# Patient Record
(demographics unavailable — no encounter records)

---

## 2025-01-10 NOTE — DISCUSSION/SUMMARY
[FreeTextEntry1] : This is a 36-year-old male with past medical history significant for supraventricular tachycardia, status post ablation procedure at [facility] February 20, 2015 (cryocatheter ablation), who comes in for cardiac consultation. The patient was well until January 3, 2025 when he went to work and at 8 AM developed chest pressure followed by profound dizziness.  He felt that the room was spinning the chest pressure was localized to the center of his chest.  Of note he did not eat any breakfast that morning.  He was taken by ambulance to [facility] where he reports he was observed for few hours and sent home.  He has had no further symptoms of dizziness, or chest pressure. He does not drink excessive caffeine or alcohol.  He has no history of rheumatic fever. Cardiac risk factors include history of smoking a pack a day for approximately 8 years (stopped about 10 years ago). Family history: Noncontributory Electrocardiogram done January 10, 2025 demonstrate sinus bradycardia rate 57 bpm is otherwise remarkable for poor R wave progression. The patient had a stress echocardiogram done at [facility] January 2025 which demonstrated a normal stress test with a peak heart rate of 171 bpm, 13 METS of exercise and no evidence of myocardial ischemia.  Stress echocardiogram summary demonstrated normal augmentation with a peak ejection fraction of 65 to 70%, physiologic tricuspid valve regurgitation, physiologic pulmonic valve regurgitation and no wall motion abnormalities.-Normal study The patient's episode of chest pressure was consistent with GERD/dyspepsia. I recommend the patient make an appointment with a gastroenterologist. He is encouraged to maintain good hydration. He is still concerned about occasional palpitations that he may get.  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 I recommended he purchase a Kardia device to evaluate him during these times of symptoms. He is encouraged to maintain good hydration. He is currently hemodynamically stable and asymptomatic from cardiac standpoint.  He will have blood work done today for lipid panel, electrolytes, and SMA 20. He is encouraged to follow-up with his primary care physician. He is currently hemodynamically stable and asymptomatic from a cardiac standpoint.

## 2025-07-11 NOTE — ASSESSMENT
[FreeTextEntry1] : This is a 36-year-old male with past medical history significant for supraventricular tachycardia, status post ablation procedure at [facility] February 20, 2015 (cryocatheter ablation), who comes in for cardiac follow-up evaluation.  He does not drink excessive caffeine or alcohol.  He has no history of rheumatic fever.  Cardiac risk factors include history of smoking a pack a day for approximately 8 years (stopped about 10 years ago).  Family history: Noncontributory   HPI: He is feeling generally well today and denies chest pain, dizziness, heart palpitations, recent episodes of syncope or falls, SOB, or dyspnea at this time.  Current Medications: None  Works as a .    BLOOD PRESSURE: Stable    BLOOD WORK: -New blood work was done 07/11/2025 to evaluate lipid profile, CBC, BMP, hepatic function, A1C and TSH. - Blood work done January 2025 demonstrated triglycerides 162, cholesterol 99, HDL 36, , non-, LDL direct 135.   TESTING/REPORTS: -EKG done July 11, 2025 demonstrated regular sinus rhythm rate 61 bpm otherwise remarkable for nonspecific ST-T wave changes.  -Electrocardiogram done January 10, 2025 demonstrate sinus bradycardia rate 57 bpm is otherwise remarkable for poor R wave progression.  -The patient had a stress echocardiogram done at [facility] January 2025 which demonstrated a normal stress test with a peak heart rate of 171 bpm, 13 METS of exercise and no evidence of myocardial ischemia. Stress echocardiogram summary demonstrated normal augmentation with a peak ejection fraction of 65 to 70%, physiologic tricuspid valve regurgitation, physiologic pulmonic valve regurgitation and no wall motion abnormalities. Normal study.   -PMHx: The patient was well until January 3, 2025 when he went to work and at 8 AM developed chest pressure followed by profound dizziness.  He felt that the room was spinning the chest pressure was localized to the center of his chest.  Of note he did not eat any breakfast that morning.  He was taken by ambulance to [facility] where he reports he was observed for few hours and sent home.     PLAN: -Follow-up with PCP routinely.    I have discussed the plan of care with ANTONIA REED and he will follow up in 6-8 months. They are compliant with all of their medications.     The patient understands that aerobic exercises must be increased to 40 minutes 4 times/week and a detailed discussion of lifestyle modification was done today.   The patient has a good understanding of the diagnosis, treatment plan and lifestyle modification.   They will contact me at the office for any questions with their care or any changes in their health status.   The patient was discussed with supervision physician Dr. John Zimmerman at the time of the visit and the plan of care will be carried out as noted above.     SUBHASH Winston NP

## 2025-07-11 NOTE — DISCUSSION/SUMMARY
[FreeTextEntry1] : Dr. Zimmerman-(PRIOR VISIT and PMH WITH Dr. Zimmerman): This is a 36-year-old male with past medical history significant for supraventricular tachycardia, status [facility] (cryocatheter ablation), who comes in for cardiac consultation. The patient was well until January 3, 2025 when he went to work and at 8 AM developed chest pressure followed by profound dizziness.  He felt that the room was spinning the chest pressure was localized to the center of his chest.  Of note he did not eat any breakfast that morning.  He was taken by ambulance to [facility] where he reports he was observed for few hours and sent home.  He has had no further symptoms of dizziness, or chest pressure. He does not drink excessive caffeine or alcohol.  He has no history of rheumatic fever. Cardiac risk factors include history of smoking a pack a day for approximately 8 years (stopped about 10 years ago). Family history: Noncontributory Electrocardiogram done January 10, 2025 demonstrate sinus bradycardia rate 57 bpm is otherwise remarkable for poor R wave progression. The patient had a stress echocardiogram done at [facility] January 2025 which demonstrated a normal stress test with a peak heart rate of 171 bpm, 13 METS of exercise and no evidence of myocardial ischemia.  Stress echocardiogram summary demonstrated normal augmentation with a peak ejection fraction of 65 to 70%, physiologic tricuspid valve regurgitation, physiologic pulmonic valve regurgitation and no wall motion abnormalities.-Normal study The patient's episode of chest pressure was consistent with GERD/dyspepsia. I recommend the patient make an appointment with a gastroenterologist. He is encouraged to maintain good hydration. He is still concerned about occasional palpitations that he may get.  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 I recommended he purchase a Kardia device to evaluate him during these times of symptoms. He is encouraged to maintain good hydration. He is currently hemodynamically stable and asymptomatic from cardiac standpoint.  He will have blood work done today for lipid panel, electrolytes, and SMA 20. He is encouraged to follow-up with his primary care physician. He is currently hemodynamically stable and asymptomatic from a cardiac standpoint.